# Patient Record
Sex: MALE | Race: WHITE
[De-identification: names, ages, dates, MRNs, and addresses within clinical notes are randomized per-mention and may not be internally consistent; named-entity substitution may affect disease eponyms.]

---

## 2018-10-06 ENCOUNTER — HOSPITAL ENCOUNTER (EMERGENCY)
Dept: HOSPITAL 62 - ER | Age: 48
Discharge: HOME | End: 2018-10-06
Payer: COMMERCIAL

## 2018-10-06 VITALS — SYSTOLIC BLOOD PRESSURE: 128 MMHG | DIASTOLIC BLOOD PRESSURE: 87 MMHG

## 2018-10-06 DIAGNOSIS — S81.012A: Primary | ICD-10-CM
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
## 2018-10-06 PROCEDURE — 12002 RPR S/N/AX/GEN/TRNK2.6-7.5CM: CPT

## 2018-10-06 PROCEDURE — 0HQLXZZ REPAIR LEFT LOWER LEG SKIN, EXTERNAL APPROACH: ICD-10-PCS | Performed by: EMERGENCY MEDICINE

## 2018-10-06 PROCEDURE — 99282 EMERGENCY DEPT VISIT SF MDM: CPT

## 2018-10-06 NOTE — ER DOCUMENT REPORT
HPI





- HPI


Pain Level: 1


Notes: 





Patient is a 48-year-old male with no significant past medical history who 

presents to the ED complaining of a cut to the medial knee from a chainsaw.  

Patient states that the cut is not deep and he has the bleeding well 

controlled.  He is still able to ambulate without difficulties.  Last tetanus 

was 4 years ago.  No other concerns or complaints at this time.  Denies any 

headache, fever, URI, sore throat, chest pain, palpitations, syncope, cough, 

shortness of breath, wheeze, dyspnea, abdominal pain, nausea/vomiting/diarrhea, 

urinary retention, dysuria, hematuria, numbness/tingling, muscle paralysis/

weakness, or rash.





- ROS


Systems Reviewed and Negative: Yes All other systems reviewed and negative





Past Medical History





- Social History


Smoking Status: Unknown if Ever Smoked


Family History: Reviewed & Not Pertinent





Vertical Provider Document





- CONSTITUTIONAL


Agree With Documented VS: Yes


Notes: 





PHYSICAL EXAMINATION:





GENERAL: Well-appearing, well-nourished and in no acute distress.





LUNGS: Breath sounds clear to auscultation bilaterally and equal.  No wheezes 

rales or rhonchi.





HEART: Regular rate and rhythm without murmurs, rubs, gallops.





Musculoskeletal: Lt knee:  No obvious swelling, ecchymosis, effusion, or 

deformity.  FROM to passive/active and flexion >90 w/o difficulty or 

tenderness. Strength 5+/5. N/V intact distal.  No bony tenderness.  Ligamentous 

grossly stable, limited exam with larger leg size.  Jon grossly negative.  

Patellar grind negative.  No calf tenderness.  + 4cm linear superficial 

laceration noted.  No active bleeding.  





Extremities:  No cyanosis, clubbing, or edema b/l.  Peripheral pulses 2+.  

Capillary refill less than 3 seconds.  Kvng neg b/l.





NEUROLOGICAL: Normal speech, normal gait.  Normal sensory, motor exams 





PSYCH: Normal mood, normal affect.





SKIN: see above.





- INFECTION CONTROL


TRAVEL OUTSIDE OF THE [place] IN LAST 30 DAYS: No





Course





- Re-evaluation


Re-evalutation: 





10/06/18 11:50


Patient is an afebrile, well-hydrated, 55-year-old female who presents to the 

ED with a superficial laceration to the left medial knee.  Vitals are 

acceptable without any significant tachycardia, tachypnea, or hypoxia.  PE is 

otherwise unremarkable for any neurovascular compromise, obvious tendon/

ligament rupture, obvious fracture/dislocation, septic joint.  Patient declined 

any Tylenol or ice.  Wound was thoroughly irrigated and cleansed.  Wound edges 

were approximated appropriately utilizing 8 simple interrupted sutures.  

Patient is able to bend knee without any difficulties.  Wound dressing placed 

and wound instructions reviewed.  Patient is nontoxic-appearing.  Patient is 

able to ambulate and weight-bear w/o difficulty.  No other labs or imaging 

warranted at this time based on H&P.  Rx for keflex as prophylaxis.  Tetanus 

utd.  Suture need removed in 10-12 days.  Conservative measures otherwise for 

symptoms.  Recheck with your PCM in 2-3 days.  Consider consult orthopedics.  

Return to the ED with any worsening/concerning symptoms otherwise as reviewed 

in discharge.  Patient is in agreement.











- Vital Signs


Vital signs: 


 











Temp Pulse Resp BP Pulse Ox


 


 98.6 F   88   16   128/80 H  98 


 


 10/06/18 10:54  10/06/18 10:54  10/06/18 10:54  10/06/18 10:54  10/06/18 10:54














Procedures





- Laceration/Wound Repair


  ** Left Knee


Time completed: 11:50


Wound length (cm): 4


Wound's Depth, Shape: Superficial, Linear


Laceration pre-procedure: Sterile PPE donned, Sterile drapes applied, Other - 

chlorhexadine/saline


Anesthetic type: 1% Lidocaine


Volume Anesthetic (mLs): 6


Wound explored: Clean, No foreign body removed


Irrigated w/ Saline (mLs): 240


Wound Debrided: Minimal


Wound Repaired With: Sutures


Suture Size/Type: 4:0, Nylon


Number of Sutures: 8


Layer Closure?: No


Post-procedure wound care: Sterile dressing applied


Post-procedure NV exam normal: Yes


Complications: No





Discharge





- Discharge


Clinical Impression: 


Laceration of knee, left


Qualifiers:


 Encounter type: initial encounter Qualified Code(s): S81.012A - Laceration 

without foreign body, left knee, initial encounter





Condition: Stable


Disposition: HOME, SELF-CARE


Instructions:  Antibiotic Ointment Protection ([facility]), Laceration Care ([facility]), 

Soap Cleansing ([facility])


Additional Instructions: 


Do not shower or bathe for 24 hours.  After 24 hours you may shower but no 

submersion of the wound under water.  Keep the original dressing on the wound 

for 24 hours unless the drainage soaks through.  Change the dressing daily 

thereafter and keep the knots of the suture material clean from any dried 

discharge.  You may leave the wound open to the air once there is no more 

discharge.  See your PCM in 2-3 days for a recheck.  Monitor for any signs of 

worsening pain or redness, purulent drainage, streaks, and/or fever.  Return to 

the ED if noticing any of the above symptoms or as needed.  Take medications as 

directed.  Your sutures will need to be removed in 10-12 days.


Prescriptions: 


Cephalexin Monohydrate [Keflex 500 mg Capsule] 500 mg PO TID #15 capsule


Forms:  Elevated Blood Pressure


Referrals: 


[facility] [Provider Group] - Follow up as needed